# Patient Record
Sex: FEMALE | Race: WHITE | URBAN - METROPOLITAN AREA
[De-identification: names, ages, dates, MRNs, and addresses within clinical notes are randomized per-mention and may not be internally consistent; named-entity substitution may affect disease eponyms.]

---

## 2021-06-11 ENCOUNTER — WEB ENCOUNTER (OUTPATIENT)
Dept: URBAN - METROPOLITAN AREA CLINIC 72 | Facility: CLINIC | Age: 71
End: 2021-06-11

## 2021-06-11 ENCOUNTER — OFFICE VISIT (OUTPATIENT)
Dept: URBAN - METROPOLITAN AREA CLINIC 72 | Facility: CLINIC | Age: 71
End: 2021-06-11
Payer: MEDICARE

## 2021-06-11 VITALS
SYSTOLIC BLOOD PRESSURE: 134 MMHG | HEART RATE: 82 BPM | DIASTOLIC BLOOD PRESSURE: 90 MMHG | TEMPERATURE: 96.6 F | HEIGHT: 61 IN | WEIGHT: 150 LBS | RESPIRATION RATE: 20 BRPM | BODY MASS INDEX: 28.32 KG/M2

## 2021-06-11 DIAGNOSIS — R10.84 GENERALIZED ABDOMINAL PAIN: ICD-10-CM

## 2021-06-11 DIAGNOSIS — K59.01 SLOW TRANSIT CONSTIPATION: ICD-10-CM

## 2021-06-11 PROCEDURE — 99203 OFFICE O/P NEW LOW 30 MIN: CPT | Performed by: INTERNAL MEDICINE

## 2021-06-11 RX ORDER — DEXTROAMPHETAMINE SACCHARATE, AMPHETAMINE ASPARTATE, DEXTROAMPHETAMINE SULFATE, AND AMPHETAMINE SULFATE 3.75; 3.75; 3.75; 3.75 MG/1; MG/1; MG/1; MG/1
3 TABLETS TABLET ORAL ONCE A DAY
Status: ACTIVE | COMMUNITY

## 2021-06-11 RX ORDER — BUPROPION HYDROCHLORIDE 300 MG/1
1 TABLET IN THE MORNING TABLET, EXTENDED RELEASE ORAL ONCE A DAY
Status: ACTIVE | COMMUNITY

## 2021-06-11 RX ORDER — B-COMPLEX WITH VITAMIN C
AS DIRECTED TABLET ORAL
Status: ACTIVE | COMMUNITY

## 2021-06-11 RX ORDER — HYDROCHLOROTHIAZIDE 25 MG/1
1 TABLET IN THE MORNING TABLET ORAL ONCE A DAY
Status: ACTIVE | COMMUNITY

## 2021-06-11 RX ORDER — ACETAMINOPHEN ER 650 MG TABLET,EXTENDED RELEASE 650 MG
2 TABLETS AS NEEDED TABLET, EXTENDED RELEASE ORAL TWICE A DAY
Status: ACTIVE | COMMUNITY

## 2021-06-11 RX ORDER — LOSARTAN POTASSIUM 50 MG/1
1 TABLET TABLET ORAL ONCE A DAY
Status: ACTIVE | COMMUNITY

## 2021-06-11 RX ORDER — GABAPENTIN 300 MG/1
1 CAPSULE CAPSULE ORAL ONCE A DAY
Status: ACTIVE | COMMUNITY

## 2021-06-11 RX ORDER — SUMATRIPTAN SUCCINATE 25 MG/1
1 TABLET AT LEAST 2 HOURS BETWEEN DOSES AS NEEDED TABLET, FILM COATED ORAL TWICE A DAY
Status: ACTIVE | COMMUNITY

## 2021-06-11 RX ORDER — PENTOSAN POLYSULFATE SODIUM 100 MG/1
1 CAPSULE ON AN EMPTY STOMACH CAPSULE, GELATIN COATED ORAL THREE TIMES A DAY
Status: ACTIVE | COMMUNITY

## 2021-06-11 RX ORDER — DULOXETINE HYDROCHLORIDE 60 MG/1
1 CAPSULE CAPSULE, DELAYED RELEASE ORAL ONCE A DAY
Status: ACTIVE | COMMUNITY

## 2021-06-11 RX ORDER — ESOMEPRAZOLE MAGNESIUM 20 MG/1
1 CAPSULE CAPSULE, DELAYED RELEASE ORAL ONCE A DAY
Status: ACTIVE | COMMUNITY

## 2021-06-11 RX ORDER — RALOXIFENE HYDROCHLORIDE 60 MG/1
1 TABLET TABLET, FILM COATED ORAL ONCE A DAY
Status: ACTIVE | COMMUNITY

## 2021-06-11 RX ORDER — CELECOXIB 200 MG/1
1 CAPSULE WITH FOOD CAPSULE ORAL TWICE A DAY
Status: ACTIVE | COMMUNITY

## 2021-06-11 NOTE — HPI-TODAY'S VISIT:
Mrs. Membreno is a pleasant 70-year-old female who presents as a new patient.  She was referred by the emergency department.  She was seen in the ER on 5/31/2021 with abdominal pain, had been seen by GI given GERD medication.  Was also treated for urinary tract infection taking Macrobid.   A noncontrasted CT scan performed in the ER showed no acute abnormalities.  She was noted to be constipated on imaging.  Lab work 5/31/2021: WBC 8.2, hemoglobin 13.2, platelet count 353, sodium 134 potassium 3.9, BUN 30.2, creatinine 1.1, alkaline phosphatase 57, ALT 26, AST 39, bilirubin 0.4, lipase 326  Per chart review, based off of her minimally elevated lipase despite having a normal CT scan, she was diagnosed with pancreatitis.   she was prescribed mmag citrate which she took but still feels somewhat constipated.  The pain is still there but slightly improved.  Her last colonoscopy was in 2019, had 1 inflammatory polyp.  Had an upper endoscopy the same time that was unremarkable.

## 2021-08-31 ENCOUNTER — OFFICE VISIT (OUTPATIENT)
Dept: URBAN - METROPOLITAN AREA CLINIC 72 | Facility: CLINIC | Age: 71
End: 2021-08-31
Payer: MEDICARE

## 2021-08-31 VITALS
BODY MASS INDEX: 28.89 KG/M2 | HEART RATE: 75 BPM | TEMPERATURE: 97.8 F | DIASTOLIC BLOOD PRESSURE: 86 MMHG | HEIGHT: 61 IN | WEIGHT: 153 LBS | SYSTOLIC BLOOD PRESSURE: 136 MMHG

## 2021-08-31 DIAGNOSIS — K59.01 SLOW TRANSIT CONSTIPATION: ICD-10-CM

## 2021-08-31 DIAGNOSIS — R14.0 ABDOMINAL BLOATING: ICD-10-CM

## 2021-08-31 PROCEDURE — 99214 OFFICE O/P EST MOD 30 MIN: CPT | Performed by: INTERNAL MEDICINE

## 2021-08-31 RX ORDER — RALOXIFENE HYDROCHLORIDE 60 MG/1
1 TABLET TABLET, FILM COATED ORAL ONCE A DAY
Status: ACTIVE | COMMUNITY

## 2021-08-31 RX ORDER — DEXTROAMPHETAMINE SACCHARATE, AMPHETAMINE ASPARTATE, DEXTROAMPHETAMINE SULFATE, AND AMPHETAMINE SULFATE 3.75; 3.75; 3.75; 3.75 MG/1; MG/1; MG/1; MG/1
3 TABLETS TABLET ORAL ONCE A DAY
Status: ACTIVE | COMMUNITY

## 2021-08-31 RX ORDER — BUPROPION HYDROCHLORIDE 300 MG/1
1 TABLET IN THE MORNING TABLET, EXTENDED RELEASE ORAL ONCE A DAY
Status: ACTIVE | COMMUNITY

## 2021-08-31 RX ORDER — ACETAMINOPHEN ER 650 MG TABLET,EXTENDED RELEASE 650 MG
2 TABLETS AS NEEDED TABLET, EXTENDED RELEASE ORAL TWICE A DAY
Status: ACTIVE | COMMUNITY

## 2021-08-31 RX ORDER — SUMATRIPTAN SUCCINATE 25 MG/1
1 TABLET AT LEAST 2 HOURS BETWEEN DOSES AS NEEDED TABLET, FILM COATED ORAL TWICE A DAY
Status: ACTIVE | COMMUNITY

## 2021-08-31 RX ORDER — DULOXETINE HYDROCHLORIDE 60 MG/1
1 CAPSULE CAPSULE, DELAYED RELEASE ORAL ONCE A DAY
Status: ACTIVE | COMMUNITY

## 2021-08-31 RX ORDER — CELECOXIB 200 MG/1
1 CAPSULE WITH FOOD CAPSULE ORAL TWICE A DAY
Status: ACTIVE | COMMUNITY

## 2021-08-31 RX ORDER — B-COMPLEX WITH VITAMIN C
AS DIRECTED TABLET ORAL
Status: ACTIVE | COMMUNITY

## 2021-08-31 RX ORDER — LOSARTAN POTASSIUM 50 MG/1
1 TABLET TABLET ORAL ONCE A DAY
Status: ACTIVE | COMMUNITY

## 2021-08-31 RX ORDER — PENTOSAN POLYSULFATE SODIUM 100 MG/1
1 CAPSULE ON AN EMPTY STOMACH CAPSULE, GELATIN COATED ORAL THREE TIMES A DAY
Status: ACTIVE | COMMUNITY

## 2021-08-31 RX ORDER — ESOMEPRAZOLE MAGNESIUM 20 MG/1
1 CAPSULE CAPSULE, DELAYED RELEASE ORAL ONCE A DAY
Status: ACTIVE | COMMUNITY

## 2021-08-31 RX ORDER — GABAPENTIN 300 MG/1
1 CAPSULE CAPSULE ORAL ONCE A DAY
Status: ACTIVE | COMMUNITY

## 2021-08-31 RX ORDER — HYDROCHLOROTHIAZIDE 25 MG/1
1 TABLET IN THE MORNING TABLET ORAL ONCE A DAY
Status: ACTIVE | COMMUNITY

## 2021-08-31 NOTE — HPI-TODAY'S VISIT:
Mrs. Membreno returns for follow-up.  She is a 70-year-old female last seen in our office on 6/11/2021.  She was seeing us for abdominal pain.  Was referred by the emergency department after being evaluated for abdominal pain treated with GERD and treated for urinary tract infection.  Had a noncontrasted CT scan showed constipation.  Reported her last colonoscopy was in 2019 with one inflammatory polyp, EGD performed at that time unremarkable.  She had a minimally elevated lipase to 362 was diagnosed with pancreatitis despite having no findings of pancreatic inflammation on imaging.  We discussed that her abdominal pain was likely more so related to her chronic constipation and there is no evidence of pancreatitis on imaging thus unlikely that she had a period we advised bowel cleanout   She has been alternating the Benefiber and MiraLAX which has helped her bowels but is still feeling some discomfort from bloating.  She reports she had an endoscopy performed in 2019 for these symptoms and it is unremarkable.  She does like the improvement of her bowel movements but does not want to stay on these medications long-term, she asks about trying something likelinzess

## 2021-09-03 ENCOUNTER — TELEPHONE ENCOUNTER (OUTPATIENT)
Dept: URBAN - METROPOLITAN AREA CLINIC 72 | Facility: CLINIC | Age: 71
End: 2021-09-03

## 2021-09-22 ENCOUNTER — TELEPHONE ENCOUNTER (OUTPATIENT)
Dept: URBAN - METROPOLITAN AREA CLINIC 72 | Facility: CLINIC | Age: 71
End: 2021-09-22

## 2021-09-22 RX ORDER — B-COMPLEX WITH VITAMIN C
AS DIRECTED TABLET ORAL
Status: ACTIVE | COMMUNITY

## 2021-09-22 RX ORDER — RALOXIFENE HYDROCHLORIDE 60 MG/1
1 TABLET TABLET, FILM COATED ORAL ONCE A DAY
Status: ACTIVE | COMMUNITY

## 2021-09-22 RX ORDER — DULOXETINE HYDROCHLORIDE 60 MG/1
1 CAPSULE CAPSULE, DELAYED RELEASE ORAL ONCE A DAY
Status: ACTIVE | COMMUNITY

## 2021-09-22 RX ORDER — ACETAMINOPHEN ER 650 MG TABLET,EXTENDED RELEASE 650 MG
2 TABLETS AS NEEDED TABLET, EXTENDED RELEASE ORAL TWICE A DAY
Status: ACTIVE | COMMUNITY

## 2021-09-22 RX ORDER — GABAPENTIN 300 MG/1
1 CAPSULE CAPSULE ORAL ONCE A DAY
Status: ACTIVE | COMMUNITY

## 2021-09-22 RX ORDER — PENTOSAN POLYSULFATE SODIUM 100 MG/1
1 CAPSULE ON AN EMPTY STOMACH CAPSULE, GELATIN COATED ORAL THREE TIMES A DAY
Status: ACTIVE | COMMUNITY

## 2021-09-22 RX ORDER — SUMATRIPTAN SUCCINATE 25 MG/1
1 TABLET AT LEAST 2 HOURS BETWEEN DOSES AS NEEDED TABLET, FILM COATED ORAL TWICE A DAY
Status: ACTIVE | COMMUNITY

## 2021-09-22 RX ORDER — CELECOXIB 200 MG/1
1 CAPSULE WITH FOOD CAPSULE ORAL TWICE A DAY
Status: ACTIVE | COMMUNITY

## 2021-09-22 RX ORDER — BUPROPION HYDROCHLORIDE 300 MG/1
1 TABLET IN THE MORNING TABLET, EXTENDED RELEASE ORAL ONCE A DAY
Status: ACTIVE | COMMUNITY

## 2021-09-22 RX ORDER — LINACLOTIDE 145 UG/1
1 CAPSULE AT LEAST 30 MINUTES BEFORE THE FIRST MEAL OF THE DAY ON AN EMPTY STOMACH CAPSULE, GELATIN COATED ORAL ONCE A DAY
Qty: 30 | OUTPATIENT
Start: 2021-09-22 | End: 2021-10-22

## 2021-09-22 RX ORDER — HYDROCHLOROTHIAZIDE 25 MG/1
1 TABLET IN THE MORNING TABLET ORAL ONCE A DAY
Status: ACTIVE | COMMUNITY

## 2021-09-22 RX ORDER — LOSARTAN POTASSIUM 50 MG/1
1 TABLET TABLET ORAL ONCE A DAY
Status: ACTIVE | COMMUNITY

## 2021-09-22 RX ORDER — DEXTROAMPHETAMINE SACCHARATE, AMPHETAMINE ASPARTATE, DEXTROAMPHETAMINE SULFATE, AND AMPHETAMINE SULFATE 3.75; 3.75; 3.75; 3.75 MG/1; MG/1; MG/1; MG/1
3 TABLETS TABLET ORAL ONCE A DAY
Status: ACTIVE | COMMUNITY

## 2021-09-22 RX ORDER — ESOMEPRAZOLE MAGNESIUM 20 MG/1
1 CAPSULE CAPSULE, DELAYED RELEASE ORAL ONCE A DAY
Status: ACTIVE | COMMUNITY

## 2021-09-30 ENCOUNTER — TELEPHONE ENCOUNTER (OUTPATIENT)
Dept: URBAN - METROPOLITAN AREA CLINIC 72 | Facility: CLINIC | Age: 71
End: 2021-09-30

## 2021-10-05 ENCOUNTER — OFFICE VISIT (OUTPATIENT)
Dept: URBAN - METROPOLITAN AREA CLINIC 72 | Facility: CLINIC | Age: 71
End: 2021-10-05
Payer: MEDICARE

## 2021-10-05 ENCOUNTER — LAB OUTSIDE AN ENCOUNTER (OUTPATIENT)
Dept: URBAN - METROPOLITAN AREA CLINIC 72 | Facility: CLINIC | Age: 71
End: 2021-10-05

## 2021-10-05 VITALS
DIASTOLIC BLOOD PRESSURE: 104 MMHG | HEIGHT: 61 IN | TEMPERATURE: 97.5 F | BODY MASS INDEX: 29.45 KG/M2 | SYSTOLIC BLOOD PRESSURE: 165 MMHG | HEART RATE: 90 BPM | WEIGHT: 156 LBS

## 2021-10-05 DIAGNOSIS — R10.84 GENERALIZED ABDOMINAL PAIN: ICD-10-CM

## 2021-10-05 DIAGNOSIS — K59.01 SLOW TRANSIT CONSTIPATION: ICD-10-CM

## 2021-10-05 PROCEDURE — 99214 OFFICE O/P EST MOD 30 MIN: CPT | Performed by: INTERNAL MEDICINE

## 2021-10-05 RX ORDER — DULOXETINE HYDROCHLORIDE 60 MG/1
1 CAPSULE CAPSULE, DELAYED RELEASE ORAL ONCE A DAY
Status: ACTIVE | COMMUNITY

## 2021-10-05 RX ORDER — GABAPENTIN 300 MG/1
1 CAPSULE CAPSULE ORAL ONCE A DAY
Status: ACTIVE | COMMUNITY

## 2021-10-05 RX ORDER — LINACLOTIDE 145 UG/1
1 CAPSULE AT LEAST 30 MINUTES BEFORE THE FIRST MEAL OF THE DAY ON AN EMPTY STOMACH CAPSULE, GELATIN COATED ORAL ONCE A DAY
Qty: 30 | Status: ON HOLD | COMMUNITY
Start: 2021-09-22 | End: 2021-10-22

## 2021-10-05 RX ORDER — ESOMEPRAZOLE MAGNESIUM 20 MG/1
1 CAPSULE CAPSULE, DELAYED RELEASE ORAL ONCE A DAY
Status: ACTIVE | COMMUNITY

## 2021-10-05 RX ORDER — CELECOXIB 200 MG/1
1 CAPSULE WITH FOOD CAPSULE ORAL TWICE A DAY
Status: ACTIVE | COMMUNITY

## 2021-10-05 RX ORDER — ACETAMINOPHEN ER 650 MG TABLET,EXTENDED RELEASE 650 MG
2 TABLETS AS NEEDED TABLET, EXTENDED RELEASE ORAL TWICE A DAY
Status: ACTIVE | COMMUNITY

## 2021-10-05 RX ORDER — RALOXIFENE HYDROCHLORIDE 60 MG/1
1 TABLET TABLET, FILM COATED ORAL ONCE A DAY
Status: ACTIVE | COMMUNITY

## 2021-10-05 RX ORDER — HYDROCHLOROTHIAZIDE 25 MG/1
1 TABLET IN THE MORNING TABLET ORAL ONCE A DAY
Status: ACTIVE | COMMUNITY

## 2021-10-05 RX ORDER — SUMATRIPTAN SUCCINATE 25 MG/1
1 TABLET AT LEAST 2 HOURS BETWEEN DOSES AS NEEDED TABLET, FILM COATED ORAL TWICE A DAY
Status: ACTIVE | COMMUNITY

## 2021-10-05 RX ORDER — B-COMPLEX WITH VITAMIN C
AS DIRECTED TABLET ORAL
Status: ACTIVE | COMMUNITY

## 2021-10-05 RX ORDER — LOSARTAN POTASSIUM 50 MG/1
1 TABLET TABLET ORAL ONCE A DAY
Status: ACTIVE | COMMUNITY

## 2021-10-05 RX ORDER — DEXTROAMPHETAMINE SACCHARATE, AMPHETAMINE ASPARTATE, DEXTROAMPHETAMINE SULFATE, AND AMPHETAMINE SULFATE 3.75; 3.75; 3.75; 3.75 MG/1; MG/1; MG/1; MG/1
3 TABLETS TABLET ORAL ONCE A DAY
Status: ACTIVE | COMMUNITY

## 2021-10-05 RX ORDER — BUPROPION HYDROCHLORIDE 300 MG/1
1 TABLET IN THE MORNING TABLET, EXTENDED RELEASE ORAL ONCE A DAY
Status: ACTIVE | COMMUNITY

## 2021-10-05 RX ORDER — PENTOSAN POLYSULFATE SODIUM 100 MG/1
1 CAPSULE ON AN EMPTY STOMACH CAPSULE, GELATIN COATED ORAL THREE TIMES A DAY
Status: ACTIVE | COMMUNITY

## 2021-10-05 NOTE — HPI-TODAY'S VISIT:
Mrs. Membreno returns for follow-up.  She is a 70-year-old female last seen in office on 8/31/2021 for evaluation of abdominal pain.  Has history of GERD and constipation. Last colonoscopy in 2019m, one inflammatory polyp.   Her constipation was managed with Benefiber and MiraLAX, linzess 145mcg was given which initially worked however she felt it lost its efficacy.  We arranged for samples of motegrity, which she has been on for approximately 1 week.   She is on numerous constipating medications.   She reports severe epigastric pain. This is worsened by food, releaved with belching. Went to ER witht hte pain, had a CT scan suggestive of gastritis, was placed on PPI and advised to follow up with us. HEr bowels are moving with the assistance of stool softeners, fiber, miralax and linzess. She was unsure if the motegrity helped.   She has pain dialy. She is noted to be on celebrex.

## 2021-10-15 ENCOUNTER — OFFICE VISIT (OUTPATIENT)
Dept: URBAN - METROPOLITAN AREA MEDICAL CENTER 40 | Facility: MEDICAL CENTER | Age: 71
End: 2021-10-15
Payer: MEDICARE

## 2021-10-15 DIAGNOSIS — K21.9 ACID REFLUX DISEASE WITH ULCER: ICD-10-CM

## 2021-10-15 DIAGNOSIS — K25.3 ACUTE GASTRIC ULCER: ICD-10-CM

## 2021-10-15 DIAGNOSIS — R10.13 ABDOMINAL PAIN, CHRONIC, EPIGASTRIC: ICD-10-CM

## 2021-10-15 DIAGNOSIS — K29.60 EROSIVE GASTRITIS: ICD-10-CM

## 2021-10-15 DIAGNOSIS — K31.89 ACQUIRED DEFORMITY OF DUODENUM: ICD-10-CM

## 2021-10-15 PROCEDURE — 43239 EGD BIOPSY SINGLE/MULTIPLE: CPT | Performed by: INTERNAL MEDICINE

## 2021-10-27 ENCOUNTER — CLAIMS CREATED FROM THE CLAIM WINDOW (OUTPATIENT)
Dept: URBAN - METROPOLITAN AREA CLINIC 72 | Facility: CLINIC | Age: 71
End: 2021-10-27
Payer: MEDICARE

## 2021-10-27 VITALS
SYSTOLIC BLOOD PRESSURE: 143 MMHG | RESPIRATION RATE: 18 BRPM | HEART RATE: 66 BPM | DIASTOLIC BLOOD PRESSURE: 86 MMHG | BODY MASS INDEX: 29.45 KG/M2 | TEMPERATURE: 98 F | WEIGHT: 156 LBS | HEIGHT: 61 IN

## 2021-10-27 DIAGNOSIS — K59.01 SLOW TRANSIT CONSTIPATION: ICD-10-CM

## 2021-10-27 DIAGNOSIS — K29.60 EROSIVE GASTRITIS: ICD-10-CM

## 2021-10-27 DIAGNOSIS — R10.84 GENERALIZED ABDOMINAL PAIN: ICD-10-CM

## 2021-10-27 DIAGNOSIS — K25.9 GASTRIC ULCER WITHOUT HEMORRHAGE OR PERFORATION, UNSPECIFIED CHRONICITY: ICD-10-CM

## 2021-10-27 PROCEDURE — 99214 OFFICE O/P EST MOD 30 MIN: CPT | Performed by: INTERNAL MEDICINE

## 2021-10-27 RX ORDER — RALOXIFENE HYDROCHLORIDE 60 MG/1
1 TABLET TABLET, FILM COATED ORAL ONCE A DAY
Status: ACTIVE | COMMUNITY

## 2021-10-27 RX ORDER — ESOMEPRAZOLE MAGNESIUM 20 MG/1
1 CAPSULE CAPSULE, DELAYED RELEASE ORAL ONCE A DAY
Status: ACTIVE | COMMUNITY

## 2021-10-27 RX ORDER — CELECOXIB 200 MG/1
1 CAPSULE WITH FOOD CAPSULE ORAL TWICE A DAY
Status: ACTIVE | COMMUNITY

## 2021-10-27 RX ORDER — BUPROPION HYDROCHLORIDE 300 MG/1
1 TABLET IN THE MORNING TABLET, EXTENDED RELEASE ORAL ONCE A DAY
Status: ACTIVE | COMMUNITY

## 2021-10-27 RX ORDER — LOSARTAN POTASSIUM 50 MG/1
1 TABLET TABLET ORAL ONCE A DAY
Status: ACTIVE | COMMUNITY

## 2021-10-27 RX ORDER — PENTOSAN POLYSULFATE SODIUM 100 MG/1
1 CAPSULE ON AN EMPTY STOMACH CAPSULE, GELATIN COATED ORAL THREE TIMES A DAY
Status: ACTIVE | COMMUNITY

## 2021-10-27 RX ORDER — GABAPENTIN 300 MG/1
1 CAPSULE CAPSULE ORAL ONCE A DAY
Status: ACTIVE | COMMUNITY

## 2021-10-27 RX ORDER — ESOMEPRAZOLE MAGNESIUM 40 MG/1
1 CAPSULE CAPSULE, DELAYED RELEASE ORAL ONCE A DAY
Qty: 90 | Refills: 1

## 2021-10-27 RX ORDER — HYDROCHLOROTHIAZIDE 25 MG/1
1 TABLET IN THE MORNING TABLET ORAL ONCE A DAY
Status: ACTIVE | COMMUNITY

## 2021-10-27 RX ORDER — SUMATRIPTAN SUCCINATE 25 MG/1
1 TABLET AT LEAST 2 HOURS BETWEEN DOSES AS NEEDED TABLET, FILM COATED ORAL TWICE A DAY
Status: ACTIVE | COMMUNITY

## 2021-10-27 RX ORDER — B-COMPLEX WITH VITAMIN C
AS DIRECTED TABLET ORAL
Status: ACTIVE | COMMUNITY

## 2021-10-27 RX ORDER — DULOXETINE HYDROCHLORIDE 60 MG/1
1 CAPSULE CAPSULE, DELAYED RELEASE ORAL ONCE A DAY
Status: ACTIVE | COMMUNITY

## 2021-10-27 RX ORDER — DEXTROAMPHETAMINE SACCHARATE, AMPHETAMINE ASPARTATE, DEXTROAMPHETAMINE SULFATE, AND AMPHETAMINE SULFATE 3.75; 3.75; 3.75; 3.75 MG/1; MG/1; MG/1; MG/1
3 TABLETS TABLET ORAL ONCE A DAY
Status: ACTIVE | COMMUNITY

## 2021-10-27 RX ORDER — ACETAMINOPHEN ER 650 MG TABLET,EXTENDED RELEASE 650 MG
2 TABLETS AS NEEDED TABLET, EXTENDED RELEASE ORAL TWICE A DAY
Status: ACTIVE | COMMUNITY

## 2021-10-27 NOTE — HPI-TODAY'S VISIT:
Mrs. Membreno returns for follow-up.  She was last seen in our office on 10/5/2021.  She has history of abdominal pain, GERD constipation.  We last saw her she was having epigastric pain and had a CT scan in the ER that showed gastritis was placed on PPI and advised to follow-up with us for possible endoscopic evaluation.  Her other chronic issues have been managed with Benefiber, MiraLAX and Linzess.  EGD was ultimately performed that did show erosive gastritis.  She did have small antral ulcer with regular edges. She is feeling much better since the procedure.  I advised that she increase her medication to 40 mg daily and she feels this is helped her.  Her bowels are moving if she uses the MiraLAX, stool softeners and Benefiber.  She overall feels well and has no complaints today.

## 2021-12-01 ENCOUNTER — OFFICE VISIT (OUTPATIENT)
Dept: URBAN - METROPOLITAN AREA CLINIC 72 | Facility: CLINIC | Age: 71
End: 2021-12-01
Payer: MEDICARE

## 2021-12-01 ENCOUNTER — LAB OUTSIDE AN ENCOUNTER (OUTPATIENT)
Dept: URBAN - METROPOLITAN AREA CLINIC 72 | Facility: CLINIC | Age: 71
End: 2021-12-01

## 2021-12-01 VITALS
BODY MASS INDEX: 29.6 KG/M2 | HEIGHT: 61 IN | RESPIRATION RATE: 18 BRPM | HEART RATE: 75 BPM | SYSTOLIC BLOOD PRESSURE: 131 MMHG | WEIGHT: 156.8 LBS | DIASTOLIC BLOOD PRESSURE: 83 MMHG | TEMPERATURE: 98.2 F

## 2021-12-01 DIAGNOSIS — R10.84 GENERALIZED ABDOMINAL PAIN: ICD-10-CM

## 2021-12-01 DIAGNOSIS — K59.01 SLOW TRANSIT CONSTIPATION: ICD-10-CM

## 2021-12-01 DIAGNOSIS — K25.9 GASTRIC ULCER WITHOUT HEMORRHAGE OR PERFORATION, UNSPECIFIED CHRONICITY: ICD-10-CM

## 2021-12-01 DIAGNOSIS — K29.60 EROSIVE GASTRITIS: ICD-10-CM

## 2021-12-01 PROBLEM — 1086791000119100: Status: ACTIVE | Noted: 2021-10-27

## 2021-12-01 PROCEDURE — 99214 OFFICE O/P EST MOD 30 MIN: CPT | Performed by: INTERNAL MEDICINE

## 2021-12-01 RX ORDER — ACETAMINOPHEN ER 650 MG TABLET,EXTENDED RELEASE 650 MG
2 TABLETS AS NEEDED TABLET, EXTENDED RELEASE ORAL TWICE A DAY
Status: ACTIVE | COMMUNITY

## 2021-12-01 RX ORDER — RALOXIFENE HYDROCHLORIDE 60 MG/1
1 TABLET TABLET, FILM COATED ORAL ONCE A DAY
Status: ACTIVE | COMMUNITY

## 2021-12-01 RX ORDER — B-COMPLEX WITH VITAMIN C
AS DIRECTED TABLET ORAL
Status: ACTIVE | COMMUNITY

## 2021-12-01 RX ORDER — LOSARTAN POTASSIUM 50 MG/1
1 TABLET TABLET ORAL ONCE A DAY
Status: ACTIVE | COMMUNITY

## 2021-12-01 RX ORDER — DEXTROAMPHETAMINE SACCHARATE, AMPHETAMINE ASPARTATE, DEXTROAMPHETAMINE SULFATE, AND AMPHETAMINE SULFATE 3.75; 3.75; 3.75; 3.75 MG/1; MG/1; MG/1; MG/1
3 TABLETS TABLET ORAL ONCE A DAY
Status: ACTIVE | COMMUNITY

## 2021-12-01 RX ORDER — SUMATRIPTAN SUCCINATE 25 MG/1
1 TABLET AT LEAST 2 HOURS BETWEEN DOSES AS NEEDED TABLET, FILM COATED ORAL TWICE A DAY
Status: ACTIVE | COMMUNITY

## 2021-12-01 RX ORDER — CELECOXIB 200 MG/1
1 CAPSULE WITH FOOD CAPSULE ORAL TWICE A DAY
Status: ACTIVE | COMMUNITY

## 2021-12-01 RX ORDER — HYDROCHLOROTHIAZIDE 25 MG/1
1 TABLET IN THE MORNING TABLET ORAL ONCE A DAY
Status: ACTIVE | COMMUNITY

## 2021-12-01 RX ORDER — DULOXETINE HYDROCHLORIDE 60 MG/1
1 CAPSULE CAPSULE, DELAYED RELEASE ORAL ONCE A DAY
Status: ACTIVE | COMMUNITY

## 2021-12-01 RX ORDER — BUPROPION HYDROCHLORIDE 300 MG/1
1 TABLET IN THE MORNING TABLET, EXTENDED RELEASE ORAL ONCE A DAY
Status: ACTIVE | COMMUNITY

## 2021-12-01 RX ORDER — GABAPENTIN 300 MG/1
1 CAPSULE CAPSULE ORAL ONCE A DAY
Status: ACTIVE | COMMUNITY

## 2021-12-01 RX ORDER — PENTOSAN POLYSULFATE SODIUM 100 MG/1
1 CAPSULE ON AN EMPTY STOMACH CAPSULE, GELATIN COATED ORAL THREE TIMES A DAY
Status: ACTIVE | COMMUNITY

## 2021-12-01 RX ORDER — ESOMEPRAZOLE MAGNESIUM 40 MG/1
1 CAPSULE CAPSULE, DELAYED RELEASE ORAL ONCE A DAY
Qty: 90 | Refills: 1 | Status: ACTIVE | COMMUNITY

## 2021-12-01 NOTE — HPI-TODAY'S VISIT:
Mrs. Membreno returns for follow-up.  Recall she is a 71-year-old female last seen in our office on 10/27/2021.  Has history of GERD and constipation.  Had a CT scan performed in the ER for abdominal pain that showed gastritis and recommended endoscopic follow-up.  Underwent an EGD that did show erosive gastritis with an antral ulcer with irregular edges.  She is placed on 40 mg PPI therapy.  In addition given her chronic constipation we recommended Linzess MiraLAX and Benefiber titrated to effect.  She is doing relatively well and last saw her.  She reports she is still doing well, still taking MiraLAX and Benefiber and has added Colace as needed, admittedly she is not drinking a lot of water and feels like this may be worsening some of her chronic constipation.  She would like to arrange follow-up of her gastric ulcer, no issues with gastritis recently.

## 2022-01-04 ENCOUNTER — OFFICE VISIT (OUTPATIENT)
Dept: URBAN - METROPOLITAN AREA CLINIC 72 | Facility: CLINIC | Age: 72
End: 2022-01-04

## 2022-01-19 ENCOUNTER — OFFICE VISIT (OUTPATIENT)
Dept: URBAN - METROPOLITAN AREA MEDICAL CENTER 40 | Facility: MEDICAL CENTER | Age: 72
End: 2022-01-19
Payer: MEDICARE

## 2022-01-19 DIAGNOSIS — K25.3 ACUTE CAMERON LESION: ICD-10-CM

## 2022-01-19 DIAGNOSIS — K21.9 ACID REFLUX: ICD-10-CM

## 2022-01-19 DIAGNOSIS — K31.89 DEFORMED PYLORUS, ACQUIRED: ICD-10-CM

## 2022-01-19 PROCEDURE — 43239 EGD BIOPSY SINGLE/MULTIPLE: CPT | Performed by: INTERNAL MEDICINE

## 2022-02-08 ENCOUNTER — OFFICE VISIT (OUTPATIENT)
Dept: URBAN - METROPOLITAN AREA CLINIC 72 | Facility: CLINIC | Age: 72
End: 2022-02-08
Payer: MEDICARE

## 2022-02-08 ENCOUNTER — LAB OUTSIDE AN ENCOUNTER (OUTPATIENT)
Dept: URBAN - METROPOLITAN AREA CLINIC 72 | Facility: CLINIC | Age: 72
End: 2022-02-08

## 2022-02-08 VITALS
DIASTOLIC BLOOD PRESSURE: 85 MMHG | WEIGHT: 159.2 LBS | BODY MASS INDEX: 30.06 KG/M2 | HEIGHT: 61 IN | SYSTOLIC BLOOD PRESSURE: 145 MMHG | RESPIRATION RATE: 18 BRPM | HEART RATE: 75 BPM | TEMPERATURE: 97.8 F

## 2022-02-08 DIAGNOSIS — K59.01 SLOW TRANSIT CONSTIPATION: ICD-10-CM

## 2022-02-08 DIAGNOSIS — K25.9 GASTRIC ULCER WITHOUT HEMORRHAGE OR PERFORATION, UNSPECIFIED CHRONICITY: ICD-10-CM

## 2022-02-08 PROCEDURE — 99214 OFFICE O/P EST MOD 30 MIN: CPT | Performed by: INTERNAL MEDICINE

## 2022-02-08 RX ORDER — BUPROPION HYDROCHLORIDE 300 MG/1
1 TABLET IN THE MORNING TABLET, EXTENDED RELEASE ORAL ONCE A DAY
Status: ACTIVE | COMMUNITY

## 2022-02-08 RX ORDER — LOSARTAN POTASSIUM 50 MG/1
1 TABLET TABLET ORAL ONCE A DAY
Status: ACTIVE | COMMUNITY

## 2022-02-08 RX ORDER — HYDROCHLOROTHIAZIDE 25 MG/1
1 TABLET IN THE MORNING TABLET ORAL ONCE A DAY
Status: ACTIVE | COMMUNITY

## 2022-02-08 RX ORDER — PRENATAL 168/IRON/FOLIC/OMEGA3 27-800-235
AS DIRECTED CAPSULE ORAL
Status: ACTIVE | COMMUNITY

## 2022-02-08 RX ORDER — PANTOPRAZOLE SODIUM 20 MG/1
1 TABLET TABLET, DELAYED RELEASE ORAL TWICE DAILY
Status: ACTIVE | COMMUNITY

## 2022-02-08 RX ORDER — RALOXIFENE HYDROCHLORIDE 60 MG/1
1 TABLET TABLET, FILM COATED ORAL ONCE A DAY
Status: ACTIVE | COMMUNITY

## 2022-02-08 RX ORDER — PENTOSAN POLYSULFATE SODIUM 100 MG/1
1 CAPSULE ON AN EMPTY STOMACH CAPSULE, GELATIN COATED ORAL THREE TIMES A DAY
Status: ACTIVE | COMMUNITY

## 2022-02-08 RX ORDER — DEXTROAMPHETAMINE SACCHARATE, AMPHETAMINE ASPARTATE, DEXTROAMPHETAMINE SULFATE, AND AMPHETAMINE SULFATE 3.75; 3.75; 3.75; 3.75 MG/1; MG/1; MG/1; MG/1
3 TABLETS TABLET ORAL
Status: ACTIVE | COMMUNITY

## 2022-02-08 RX ORDER — B-COMPLEX WITH VITAMIN C
AS DIRECTED TABLET ORAL
Status: ACTIVE | COMMUNITY

## 2022-02-08 RX ORDER — DULOXETINE HYDROCHLORIDE 60 MG/1
1 CAPSULE CAPSULE, DELAYED RELEASE ORAL ONCE A DAY
Status: ACTIVE | COMMUNITY

## 2022-02-08 RX ORDER — CELECOXIB 200 MG/1
1 CAPSULE WITH FOOD CAPSULE ORAL TWICE A DAY
Status: DISCONTINUED | COMMUNITY

## 2022-02-08 RX ORDER — ESOMEPRAZOLE MAGNESIUM 40 MG/1
1 CAPSULE CAPSULE, DELAYED RELEASE ORAL ONCE A DAY
Qty: 90 | Refills: 1 | Status: DISCONTINUED | COMMUNITY

## 2022-02-08 RX ORDER — GABAPENTIN 300 MG/1
1 CAPSULE CAPSULE ORAL ONCE A DAY
Status: ACTIVE | COMMUNITY

## 2022-02-08 RX ORDER — ACETAMINOPHEN ER 650 MG TABLET,EXTENDED RELEASE 650 MG
2 TABLETS AS NEEDED TABLET, EXTENDED RELEASE ORAL TWICE A DAY
Status: ACTIVE | COMMUNITY

## 2022-02-08 RX ORDER — SUMATRIPTAN SUCCINATE 25 MG/1
1 TABLET AT LEAST 2 HOURS BETWEEN DOSES AS NEEDED TABLET, FILM COATED ORAL TWICE A DAY
Status: ACTIVE | COMMUNITY

## 2022-02-08 NOTE — HPI-TODAY'S VISIT:
Mrs. Membreno returns for follow-up.  Recall she is a pleasant 71-year-old female last seen in our office on 12/1/2021.  History of GERD and constipation.  Been seen in the emergency department for this in the past had a CT scan that showed gastritis was recommended endoscopic follow-up.  EGD performed did show erosive gastritis with an antral ulcer and irregular edges.  She was placed on 40 mg PPI therapy.  Her constipation she has been on MiraLAX and Benefiber titrated to effect we recommended Linzess.  She been doing well on her treatment and we advised a follow-up EGD to survey her ulcer healing.  This was done on 1/19/2022 a forced grade 3 ulcer was seen coinciding with the previous location the posterior wall of the antrum multiple biopsies were collected.  Pathology returned showing no cancer.  Was noted the patient was on NSAIDs and we advised her to quit using these.  We also recommended increasing her PPI to twice daily   She is doing well, off NSAIDs, stomach feels better. She is working to try to loose weight

## 2022-02-11 ENCOUNTER — TELEPHONE ENCOUNTER (OUTPATIENT)
Dept: URBAN - METROPOLITAN AREA CLINIC 107 | Facility: CLINIC | Age: 72
End: 2022-02-11

## 2022-05-06 ENCOUNTER — TELEPHONE ENCOUNTER (OUTPATIENT)
Dept: URBAN - METROPOLITAN AREA CLINIC 72 | Facility: CLINIC | Age: 72
End: 2022-05-06

## 2022-05-06 RX ORDER — PANTOPRAZOLE SODIUM 20 MG/1
1 TABLET TABLET, DELAYED RELEASE ORAL ONCE A DAY
Qty: 90 | Refills: 1

## 2022-06-01 ENCOUNTER — TELEPHONE ENCOUNTER (OUTPATIENT)
Dept: URBAN - METROPOLITAN AREA CLINIC 72 | Facility: CLINIC | Age: 72
End: 2022-06-01

## 2022-07-08 ENCOUNTER — OFFICE VISIT (OUTPATIENT)
Dept: URBAN - METROPOLITAN AREA MEDICAL CENTER 40 | Facility: MEDICAL CENTER | Age: 72
End: 2022-07-08

## 2022-07-28 ENCOUNTER — OFFICE VISIT (OUTPATIENT)
Dept: URBAN - METROPOLITAN AREA CLINIC 72 | Facility: CLINIC | Age: 72
End: 2022-07-28

## 2022-08-05 ENCOUNTER — OFFICE VISIT (OUTPATIENT)
Dept: URBAN - METROPOLITAN AREA MEDICAL CENTER 40 | Facility: MEDICAL CENTER | Age: 72
End: 2022-08-05
Payer: MEDICARE

## 2022-08-05 DIAGNOSIS — K31.89 ACQUIRED DEFORMITY OF DUODENUM: ICD-10-CM

## 2022-08-05 DIAGNOSIS — K21.9 ACID REFLUX: ICD-10-CM

## 2022-08-05 DIAGNOSIS — K25.7 CHRONIC GASTRIC EROSION: ICD-10-CM

## 2022-08-05 DIAGNOSIS — Z87.11 H/O GASTRIC ULCER: ICD-10-CM

## 2022-08-05 PROCEDURE — 43239 EGD BIOPSY SINGLE/MULTIPLE: CPT | Performed by: INTERNAL MEDICINE

## 2022-08-15 PROBLEM — 1567007 CHRONIC GASTRIC ULCER WITHOUT HEMORRHAGE, WITHOUT PERFORATION AND WITHOUT OBSTRUCTION: Status: ACTIVE | Noted: 2022-08-15

## 2022-08-19 ENCOUNTER — OFFICE VISIT (OUTPATIENT)
Dept: URBAN - METROPOLITAN AREA CLINIC 72 | Facility: CLINIC | Age: 72
End: 2022-08-19

## 2022-08-24 ENCOUNTER — WEB ENCOUNTER (OUTPATIENT)
Dept: URBAN - METROPOLITAN AREA CLINIC 72 | Facility: CLINIC | Age: 72
End: 2022-08-24

## 2022-08-26 ENCOUNTER — OFFICE VISIT (OUTPATIENT)
Dept: URBAN - METROPOLITAN AREA CLINIC 72 | Facility: CLINIC | Age: 72
End: 2022-08-26

## 2022-08-26 RX ORDER — DULOXETINE HYDROCHLORIDE 60 MG/1
1 CAPSULE CAPSULE, DELAYED RELEASE ORAL ONCE A DAY
Status: ACTIVE | COMMUNITY

## 2022-08-26 RX ORDER — RALOXIFENE HYDROCHLORIDE 60 MG/1
1 TABLET TABLET, FILM COATED ORAL ONCE A DAY
Status: ACTIVE | COMMUNITY

## 2022-08-26 RX ORDER — ACETAMINOPHEN ER 650 MG TABLET,EXTENDED RELEASE 650 MG
2 TABLETS AS NEEDED TABLET, EXTENDED RELEASE ORAL TWICE A DAY
Status: ACTIVE | COMMUNITY

## 2022-08-26 RX ORDER — SUMATRIPTAN SUCCINATE 25 MG/1
1 TABLET AT LEAST 2 HOURS BETWEEN DOSES AS NEEDED TABLET, FILM COATED ORAL TWICE A DAY
Status: ACTIVE | COMMUNITY

## 2022-08-26 RX ORDER — GABAPENTIN 300 MG/1
1 CAPSULE CAPSULE ORAL ONCE A DAY
Status: ACTIVE | COMMUNITY

## 2022-08-26 RX ORDER — B-COMPLEX WITH VITAMIN C
AS DIRECTED TABLET ORAL
Status: ACTIVE | COMMUNITY

## 2022-08-26 RX ORDER — PRENATAL 168/IRON/FOLIC/OMEGA3 27-800-235
AS DIRECTED CAPSULE ORAL
Status: ACTIVE | COMMUNITY

## 2022-08-26 RX ORDER — HYDROCHLOROTHIAZIDE 25 MG/1
1 TABLET IN THE MORNING TABLET ORAL ONCE A DAY
Status: ACTIVE | COMMUNITY

## 2022-08-26 RX ORDER — BUPROPION HYDROCHLORIDE 300 MG/1
1 TABLET IN THE MORNING TABLET, EXTENDED RELEASE ORAL ONCE A DAY
Status: ACTIVE | COMMUNITY

## 2022-08-26 RX ORDER — PENTOSAN POLYSULFATE SODIUM 100 MG/1
1 CAPSULE ON AN EMPTY STOMACH CAPSULE, GELATIN COATED ORAL THREE TIMES A DAY
Status: ACTIVE | COMMUNITY

## 2022-08-26 RX ORDER — PANTOPRAZOLE SODIUM 20 MG/1
1 TABLET TABLET, DELAYED RELEASE ORAL ONCE A DAY
Qty: 90 | Refills: 1 | Status: ACTIVE | COMMUNITY

## 2022-08-26 RX ORDER — DEXTROAMPHETAMINE SACCHARATE, AMPHETAMINE ASPARTATE, DEXTROAMPHETAMINE SULFATE, AND AMPHETAMINE SULFATE 3.75; 3.75; 3.75; 3.75 MG/1; MG/1; MG/1; MG/1
3 TABLETS TABLET ORAL
Status: ACTIVE | COMMUNITY

## 2022-08-26 RX ORDER — LOSARTAN POTASSIUM 50 MG/1
1 TABLET TABLET ORAL ONCE A DAY
Status: ACTIVE | COMMUNITY

## 2022-09-08 ENCOUNTER — WEB ENCOUNTER (OUTPATIENT)
Dept: URBAN - METROPOLITAN AREA CLINIC 72 | Facility: CLINIC | Age: 72
End: 2022-09-08

## 2022-09-13 ENCOUNTER — OFFICE VISIT (OUTPATIENT)
Dept: URBAN - METROPOLITAN AREA CLINIC 72 | Facility: CLINIC | Age: 72
End: 2022-09-13
Payer: MEDICARE

## 2022-09-13 VITALS
WEIGHT: 155 LBS | DIASTOLIC BLOOD PRESSURE: 84 MMHG | HEART RATE: 96 BPM | BODY MASS INDEX: 29.27 KG/M2 | TEMPERATURE: 98 F | HEIGHT: 61 IN | RESPIRATION RATE: 19 BRPM | SYSTOLIC BLOOD PRESSURE: 125 MMHG

## 2022-09-13 DIAGNOSIS — K25.9 GASTRIC ULCER WITHOUT HEMORRHAGE OR PERFORATION, UNSPECIFIED CHRONICITY: ICD-10-CM

## 2022-09-13 DIAGNOSIS — K59.01 SLOW TRANSIT CONSTIPATION: ICD-10-CM

## 2022-09-13 DIAGNOSIS — R10.84 GENERALIZED ABDOMINAL PAIN: ICD-10-CM

## 2022-09-13 PROBLEM — 35298007: Status: ACTIVE | Noted: 2021-06-11

## 2022-09-13 PROBLEM — 73481001: Status: ACTIVE | Noted: 2021-10-27

## 2022-09-13 PROCEDURE — 99213 OFFICE O/P EST LOW 20 MIN: CPT | Performed by: INTERNAL MEDICINE

## 2022-09-13 RX ORDER — PENTOSAN POLYSULFATE SODIUM 100 MG/1
1 CAPSULE ON AN EMPTY STOMACH CAPSULE, GELATIN COATED ORAL THREE TIMES A DAY
Status: ACTIVE | COMMUNITY

## 2022-09-13 RX ORDER — PANTOPRAZOLE SODIUM 20 MG/1
1 TABLET TABLET, DELAYED RELEASE ORAL ONCE A DAY
Qty: 90 | Refills: 1 | Status: ACTIVE | COMMUNITY

## 2022-09-13 RX ORDER — B-COMPLEX WITH VITAMIN C
AS DIRECTED TABLET ORAL
Status: ACTIVE | COMMUNITY

## 2022-09-13 RX ORDER — BUPROPION HYDROCHLORIDE 300 MG/1
1 TABLET IN THE MORNING TABLET, EXTENDED RELEASE ORAL ONCE A DAY
Status: ACTIVE | COMMUNITY

## 2022-09-13 RX ORDER — LOSARTAN POTASSIUM 50 MG/1
1 TABLET TABLET ORAL ONCE A DAY
Status: ACTIVE | COMMUNITY

## 2022-09-13 RX ORDER — GABAPENTIN 300 MG/1
1 CAPSULE CAPSULE ORAL ONCE A DAY
Status: ACTIVE | COMMUNITY

## 2022-09-13 RX ORDER — HYDROCHLOROTHIAZIDE 25 MG/1
1 TABLET IN THE MORNING TABLET ORAL ONCE A DAY
Status: ACTIVE | COMMUNITY

## 2022-09-13 RX ORDER — RALOXIFENE HYDROCHLORIDE 60 MG/1
1 TABLET TABLET, FILM COATED ORAL ONCE A DAY
Status: ACTIVE | COMMUNITY

## 2022-09-13 RX ORDER — SUMATRIPTAN SUCCINATE 25 MG/1
1 TABLET AT LEAST 2 HOURS BETWEEN DOSES AS NEEDED TABLET, FILM COATED ORAL TWICE A DAY
Status: ACTIVE | COMMUNITY

## 2022-09-13 RX ORDER — DULOXETINE HYDROCHLORIDE 60 MG/1
1 CAPSULE CAPSULE, DELAYED RELEASE ORAL ONCE A DAY
Status: ACTIVE | COMMUNITY

## 2022-09-13 RX ORDER — PRENATAL 168/IRON/FOLIC/OMEGA3 27-800-235
AS DIRECTED CAPSULE ORAL
Status: ACTIVE | COMMUNITY

## 2022-09-13 RX ORDER — DEXTROAMPHETAMINE SACCHARATE, AMPHETAMINE ASPARTATE, DEXTROAMPHETAMINE SULFATE, AND AMPHETAMINE SULFATE 3.75; 3.75; 3.75; 3.75 MG/1; MG/1; MG/1; MG/1
3 TABLETS TABLET ORAL
Status: ACTIVE | COMMUNITY

## 2022-09-13 RX ORDER — ACETAMINOPHEN ER 650 MG TABLET,EXTENDED RELEASE 650 MG
2 TABLETS AS NEEDED TABLET, EXTENDED RELEASE ORAL TWICE A DAY
Status: ACTIVE | COMMUNITY

## 2022-09-13 NOTE — HPI-TODAY'S VISIT:
Mrs. Membreno returns for follow up, last seen 2022.  We have been following her for GERD, constipation and abnormal CT scan showing gastritis.  She has had EGD that showed erosive gastritis and antral ulcer with irregular edges, she has been on high-dose PPI therapy.  MiraLAX and Benefiber as needed for constipation.  She has had surveillance EGD to follow-up on the ulcer in 1/19/2022 that showed Koffi grade 3 ulcer coinciding with previous location of the posterior wall multiple biopsies returned showing no cancer granulation tissue.  She is on NSAIDs we advised stopping these and increase her PPI to twice daily.  Since that time she has had a relook endoscopy after months of treatment, 8/5/2022 gastric erosion and superficial inflammation seen in the site of previous ulceration.  No active bleeding in the area.  Nearly healed.  Biopsies were taken and found to be reactive gastropathy. She has been doing well with no complaints, on pantoprazole once a day.  Has had no further abdominal pain.  Not taking any NSAIDs.  Last colonoscopy was in 2019, not due for repeat yet.

## 2022-10-05 ENCOUNTER — OFFICE VISIT (OUTPATIENT)
Dept: URBAN - METROPOLITAN AREA CLINIC 72 | Facility: CLINIC | Age: 72
End: 2022-10-05

## 2023-11-01 ENCOUNTER — OFFICE VISIT (OUTPATIENT)
Dept: URBAN - METROPOLITAN AREA CLINIC 72 | Facility: CLINIC | Age: 73
End: 2023-11-01

## 2023-12-07 ENCOUNTER — OFFICE VISIT (OUTPATIENT)
Dept: URBAN - METROPOLITAN AREA CLINIC 72 | Facility: CLINIC | Age: 73
End: 2023-12-07

## 2023-12-20 ENCOUNTER — CLAIMS CREATED FROM THE CLAIM WINDOW (OUTPATIENT)
Dept: URBAN - METROPOLITAN AREA CLINIC 72 | Facility: CLINIC | Age: 73
End: 2023-12-20
Payer: MEDICARE

## 2023-12-20 ENCOUNTER — LAB OUTSIDE AN ENCOUNTER (OUTPATIENT)
Dept: URBAN - METROPOLITAN AREA CLINIC 72 | Facility: CLINIC | Age: 73
End: 2023-12-20

## 2023-12-20 ENCOUNTER — DASHBOARD ENCOUNTERS (OUTPATIENT)
Age: 73
End: 2023-12-20

## 2023-12-20 VITALS
TEMPERATURE: 96.9 F | BODY MASS INDEX: 30.78 KG/M2 | HEART RATE: 93 BPM | DIASTOLIC BLOOD PRESSURE: 77 MMHG | SYSTOLIC BLOOD PRESSURE: 135 MMHG | HEIGHT: 61 IN | WEIGHT: 163 LBS

## 2023-12-20 DIAGNOSIS — Z87.11 HISTORY OF PEPTIC ULCER DISEASE: ICD-10-CM

## 2023-12-20 DIAGNOSIS — K21.9 GASTROESOPHAGEAL REFLUX DISEASE WITHOUT ESOPHAGITIS: ICD-10-CM

## 2023-12-20 DIAGNOSIS — R14.0 ABDOMINAL BLOATING: ICD-10-CM

## 2023-12-20 DIAGNOSIS — K59.01 SLOW TRANSIT CONSTIPATION: ICD-10-CM

## 2023-12-20 PROBLEM — 266435005: Status: ACTIVE | Noted: 2023-12-20

## 2023-12-20 PROCEDURE — 99214 OFFICE O/P EST MOD 30 MIN: CPT | Performed by: NURSE PRACTITIONER

## 2023-12-20 RX ORDER — SUMATRIPTAN SUCCINATE 25 MG/1
1 TABLET AT LEAST 2 HOURS BETWEEN DOSES AS NEEDED TABLET, FILM COATED ORAL TWICE A DAY
Status: ACTIVE | COMMUNITY

## 2023-12-20 RX ORDER — PRENATAL 168/IRON/FOLIC/OMEGA3 27-800-235
AS DIRECTED CAPSULE ORAL
Status: ACTIVE | COMMUNITY

## 2023-12-20 RX ORDER — HYDROCHLOROTHIAZIDE 25 MG/1
1 TABLET IN THE MORNING TABLET ORAL ONCE A DAY
Status: ACTIVE | COMMUNITY

## 2023-12-20 RX ORDER — RALOXIFENE HYDROCHLORIDE 60 MG/1
1 TABLET TABLET, FILM COATED ORAL ONCE A DAY
Status: ACTIVE | COMMUNITY

## 2023-12-20 RX ORDER — ACETAMINOPHEN ER 650 MG TABLET,EXTENDED RELEASE 650 MG
2 TABLETS AS NEEDED TABLET, EXTENDED RELEASE ORAL TWICE A DAY
Status: ACTIVE | COMMUNITY

## 2023-12-20 RX ORDER — GABAPENTIN 300 MG/1
1 CAPSULE CAPSULE ORAL ONCE A DAY
Status: ACTIVE | COMMUNITY

## 2023-12-20 RX ORDER — PANTOPRAZOLE SODIUM 20 MG/1
1 TABLET TABLET, DELAYED RELEASE ORAL ONCE A DAY
Qty: 90 | Refills: 3

## 2023-12-20 RX ORDER — PENTOSAN POLYSULFATE SODIUM 100 MG/1
1 CAPSULE ON AN EMPTY STOMACH CAPSULE, GELATIN COATED ORAL THREE TIMES A DAY
Status: ACTIVE | COMMUNITY

## 2023-12-20 RX ORDER — LOSARTAN POTASSIUM 50 MG/1
1 TABLET TABLET ORAL ONCE A DAY
Status: ACTIVE | COMMUNITY

## 2023-12-20 RX ORDER — DULOXETINE HYDROCHLORIDE 60 MG/1
1 CAPSULE CAPSULE, DELAYED RELEASE ORAL ONCE A DAY
Status: ACTIVE | COMMUNITY

## 2023-12-20 RX ORDER — PANTOPRAZOLE SODIUM 20 MG/1
1 TABLET TABLET, DELAYED RELEASE ORAL ONCE A DAY
Qty: 90 | Refills: 1 | Status: ACTIVE | COMMUNITY

## 2023-12-20 RX ORDER — DEXTROAMPHETAMINE SACCHARATE, AMPHETAMINE ASPARTATE, DEXTROAMPHETAMINE SULFATE, AND AMPHETAMINE SULFATE 3.75; 3.75; 3.75; 3.75 MG/1; MG/1; MG/1; MG/1
3 TABLETS TABLET ORAL
Status: ACTIVE | COMMUNITY

## 2023-12-20 RX ORDER — B-COMPLEX WITH VITAMIN C
AS DIRECTED TABLET ORAL
Status: ACTIVE | COMMUNITY

## 2023-12-20 RX ORDER — BUPROPION HYDROCHLORIDE 300 MG/1
1 TABLET IN THE MORNING TABLET, EXTENDED RELEASE ORAL ONCE A DAY
Status: ACTIVE | COMMUNITY

## 2023-12-20 NOTE — HPI-TODAY'S VISIT:
73-year-old female here for an annual appointment for medication.  Last seen 9/13/2022 for EGD follow-up, constipation and history of gastric ulcer.  Advised to decrease PPI to once a day with plan to wean off in 3 months.  Constipation resolved and abdominal pain had resolved.  She has flares of GERD, she takes a lot of Tums and drinks Ginger Ale. Gets bloated a lot. Used to be on celebrex for arthritis but is now on tylenol which she feels isn't helping. Sees her PCP Dr. Pederson in January. Using diclofenac gel for leg pain. Takes stool softener and miralax every other day for constipation. Denies melena or hematochezia.  No CP, SOB, palpitations, syncope, near-syncope or problems with anesthesia previously.

## 2023-12-20 NOTE — HPI-OTHER HISTORIES
Procedures: -Last colonoscopy was in 2019, due 2024 -EGD to follow-up on the ulcer in 1/19/2022 that showed Koffi grade 3 ulcer coinciding with previous location of the posterior wall multiple biopsies returned showing no cancer granulation tissue. -Endoscopy after months of treatment, 8/5/2022 gastric erosion and superficial inflammation seen in the site of previous ulceration. No active bleeding in the area. Nearly healed. Biopsies were taken and found to be reactive gastropathy. . Labs 4/27/2023.  TSH, Vitamin D, CMP and CBC all normal with Hgb 12.8.

## 2023-12-29 ENCOUNTER — TELEPHONE ENCOUNTER (OUTPATIENT)
Dept: URBAN - METROPOLITAN AREA CLINIC 72 | Facility: CLINIC | Age: 73
End: 2023-12-29

## 2024-01-04 LAB
BUN/CREATININE RATIO: 27
BUN: 27
CREATININE: 1
EGFR: 59

## 2024-06-03 ENCOUNTER — TELEPHONE ENCOUNTER (OUTPATIENT)
Dept: URBAN - METROPOLITAN AREA CLINIC 113 | Facility: CLINIC | Age: 74
End: 2024-06-03

## 2024-06-03 ENCOUNTER — LAB OUTSIDE AN ENCOUNTER (OUTPATIENT)
Dept: URBAN - METROPOLITAN AREA CLINIC 113 | Facility: CLINIC | Age: 74
End: 2024-06-03

## 2024-06-03 ENCOUNTER — OFFICE VISIT (OUTPATIENT)
Dept: URBAN - METROPOLITAN AREA MEDICAL CENTER 40 | Facility: MEDICAL CENTER | Age: 74
End: 2024-06-03
Payer: MEDICARE

## 2024-06-03 DIAGNOSIS — R14.0 BLOATING: ICD-10-CM

## 2024-06-03 DIAGNOSIS — Z87.11 H/O GASTRIC ULCER: ICD-10-CM

## 2024-06-03 DIAGNOSIS — K63.89 OTHER SPECIFIED DISEASES OF INTESTINE: ICD-10-CM

## 2024-06-03 DIAGNOSIS — K31.89 OTHER DISEASES OF STOMACH AND DUODENUM: ICD-10-CM

## 2024-06-03 PROCEDURE — 45378 DIAGNOSTIC COLONOSCOPY: CPT | Performed by: INTERNAL MEDICINE

## 2024-06-03 PROCEDURE — 43239 EGD BIOPSY SINGLE/MULTIPLE: CPT | Performed by: INTERNAL MEDICINE

## 2024-06-03 RX ORDER — PENTOSAN POLYSULFATE SODIUM 100 MG/1
1 CAPSULE ON AN EMPTY STOMACH CAPSULE, GELATIN COATED ORAL THREE TIMES A DAY
Status: ACTIVE | COMMUNITY

## 2024-06-03 RX ORDER — SUMATRIPTAN SUCCINATE 25 MG/1
1 TABLET AT LEAST 2 HOURS BETWEEN DOSES AS NEEDED TABLET, FILM COATED ORAL TWICE A DAY
Status: ACTIVE | COMMUNITY

## 2024-06-03 RX ORDER — B-COMPLEX WITH VITAMIN C
AS DIRECTED TABLET ORAL
Status: ACTIVE | COMMUNITY

## 2024-06-03 RX ORDER — PANTOPRAZOLE SODIUM 20 MG/1
1 TABLET 30 MINUTES BEFORE BREAKFAST ON AN EMPTY STOMACH TABLET, DELAYED RELEASE ORAL ONCE A DAY
Qty: 90 | Refills: 3 | Status: ACTIVE | COMMUNITY

## 2024-06-03 RX ORDER — BUPROPION HYDROCHLORIDE 300 MG/1
1 TABLET IN THE MORNING TABLET, EXTENDED RELEASE ORAL ONCE A DAY
Status: ACTIVE | COMMUNITY

## 2024-06-03 RX ORDER — LOSARTAN POTASSIUM 50 MG/1
1 TABLET TABLET ORAL ONCE A DAY
Status: ACTIVE | COMMUNITY

## 2024-06-03 RX ORDER — ACETAMINOPHEN ER 650 MG TABLET,EXTENDED RELEASE 650 MG
2 TABLETS AS NEEDED TABLET, EXTENDED RELEASE ORAL TWICE A DAY
Status: ACTIVE | COMMUNITY

## 2024-06-03 RX ORDER — PRENATAL 168/IRON/FOLIC/OMEGA3 27-800-235
AS DIRECTED CAPSULE ORAL
Status: ACTIVE | COMMUNITY

## 2024-06-03 RX ORDER — RALOXIFENE HYDROCHLORIDE 60 MG/1
1 TABLET TABLET, FILM COATED ORAL ONCE A DAY
Status: ACTIVE | COMMUNITY

## 2024-06-03 RX ORDER — HYDROCHLOROTHIAZIDE 25 MG/1
1 TABLET IN THE MORNING TABLET ORAL ONCE A DAY
Status: ACTIVE | COMMUNITY

## 2024-06-03 RX ORDER — DULOXETINE HYDROCHLORIDE 60 MG/1
1 CAPSULE CAPSULE, DELAYED RELEASE ORAL ONCE A DAY
Status: ACTIVE | COMMUNITY

## 2024-06-03 RX ORDER — GABAPENTIN 300 MG/1
1 CAPSULE CAPSULE ORAL ONCE A DAY
Status: ACTIVE | COMMUNITY

## 2024-06-03 RX ORDER — DEXTROAMPHETAMINE SACCHARATE, AMPHETAMINE ASPARTATE, DEXTROAMPHETAMINE SULFATE, AND AMPHETAMINE SULFATE 3.75; 3.75; 3.75; 3.75 MG/1; MG/1; MG/1; MG/1
3 TABLETS TABLET ORAL
Status: ACTIVE | COMMUNITY

## 2024-06-18 ENCOUNTER — LAB OUTSIDE AN ENCOUNTER (OUTPATIENT)
Dept: URBAN - METROPOLITAN AREA CLINIC 72 | Facility: CLINIC | Age: 74
End: 2024-06-18

## 2024-06-18 ENCOUNTER — TELEPHONE ENCOUNTER (OUTPATIENT)
Dept: URBAN - METROPOLITAN AREA CLINIC 72 | Facility: CLINIC | Age: 74
End: 2024-06-18

## 2024-06-18 PROBLEM — 309088003: Status: ACTIVE | Noted: 2024-06-18

## 2024-07-19 ENCOUNTER — TELEPHONE ENCOUNTER (OUTPATIENT)
Dept: URBAN - METROPOLITAN AREA CLINIC 113 | Facility: CLINIC | Age: 74
End: 2024-07-19